# Patient Record
Sex: FEMALE | URBAN - METROPOLITAN AREA
[De-identification: names, ages, dates, MRNs, and addresses within clinical notes are randomized per-mention and may not be internally consistent; named-entity substitution may affect disease eponyms.]

---

## 2024-04-11 ENCOUNTER — APPOINTMENT (RX ONLY)
Dept: URBAN - METROPOLITAN AREA CLINIC 4 | Facility: CLINIC | Age: 44
Setting detail: DERMATOLOGY
End: 2024-04-11

## 2024-04-11 DIAGNOSIS — L93.2 OTHER LOCAL LUPUS ERYTHEMATOSUS: ICD-10-CM | Status: INADEQUATELY CONTROLLED

## 2024-04-11 DIAGNOSIS — L81.0 POSTINFLAMMATORY HYPERPIGMENTATION: ICD-10-CM

## 2024-04-11 PROCEDURE — ? PRESCRIPTION

## 2024-04-11 PROCEDURE — ? COUNSELING

## 2024-04-11 PROCEDURE — ? RECORDS REVIEWED

## 2024-04-11 PROCEDURE — ? REVIEW OF OUTSIDE PATHOLOGY REPORTS

## 2024-04-11 PROCEDURE — ? PHOTO-DOCUMENTATION

## 2024-04-11 PROCEDURE — ? LAB REPORTS REVIEWED

## 2024-04-11 PROCEDURE — 99204 OFFICE O/P NEW MOD 45 MIN: CPT

## 2024-04-11 PROCEDURE — ? DIAGNOSIS COMMENT

## 2024-04-11 PROCEDURE — ? PRESCRIPTION MEDICATION MANAGEMENT

## 2024-04-11 RX ORDER — PIMECROLIMUS 10 MG/G
1 CREAM TOPICAL BID
Qty: 60 | Refills: 2 | Status: ERX | COMMUNITY
Start: 2024-04-11

## 2024-04-11 RX ORDER — BETAMETHASONE DIPROPIONATE 0.5 MG/ML
1 LOTION TOPICAL BID
Qty: 60 | Refills: 2 | Status: ERX | COMMUNITY
Start: 2024-04-11

## 2024-04-11 RX ADMIN — BETAMETHASONE DIPROPIONATE 1: 0.5 LOTION TOPICAL at 00:00

## 2024-04-11 RX ADMIN — PIMECROLIMUS 1: 10 CREAM TOPICAL at 00:00

## 2024-04-11 ASSESSMENT — LOCATION DETAILED DESCRIPTION DERM
LOCATION DETAILED: SUPERIOR LUMBAR SPINE
LOCATION DETAILED: PERIUMBILICAL SKIN
LOCATION DETAILED: LEFT INFERIOR MEDIAL MIDBACK

## 2024-04-11 ASSESSMENT — LOCATION SIMPLE DESCRIPTION DERM
LOCATION SIMPLE: LOWER BACK
LOCATION SIMPLE: ABDOMEN
LOCATION SIMPLE: LEFT LOWER BACK

## 2024-04-11 ASSESSMENT — BSA RASH: BSA RASH: 10

## 2024-04-11 ASSESSMENT — SEVERITY ASSESSMENT: SEVERITY: MODERATE

## 2024-04-11 ASSESSMENT — LOCATION ZONE DERM: LOCATION ZONE: TRUNK

## 2024-04-11 NOTE — HPI: RASH
Is The Patient Presenting As Previously Scheduled?: No, they are a work-in
Is This A New Presentation, Or A Follow-Up?: Rash
Additional History: Itching comes and goes. Triggered by exercise. Patient believes it started on her side/back and then gradually spread over the years. Rash has been biopsied multiple times. 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 Recently saw auto immune specialist Dr. Nell Braswell. Ordered blood work (patient trying to obtain results) systemic lupus was ruled out. Patient took plaqunel for less than one month. Patient broke out in hives. Only talking probiotic and Prozac. Patient reports rash today is not itchy. Rash mainly itches at night. Lmp 03/2024

## 2024-04-11 NOTE — PROCEDURE: PRESCRIPTION MEDICATION MANAGEMENT
Initiate Treatment: Betamethasone lotion bid x 2 weeks then Pimecrolimus bid x 2 weeks following betamethasone, repeat
Detail Level: Zone
Render In Strict Bullet Format?: No

## 2024-04-11 NOTE — PROCEDURE: LAB REPORTS REVIEWED
Detail Level: Zone
Summarized Lab Results: 10/27/2019 - CBC (EOS H), CMP, UA (+RBC), MELISSA (WNL), DsDNA (neg), RNP (neg), smith ab (neg), anti-SS-A (neg), anti-SS-B (neg), anti-Laura-1 (neg), anti-centromere (neg), LDH (neg), Creatine Kinase (neg), Sed Rate (neg), Aldolase (neg)\\n1/17/2020 - UA (WNL)\\n7/2/2021 - MELISSA (negative)

## 2024-04-11 NOTE — PROCEDURE: DIAGNOSIS COMMENT
Detail Level: Zone
Comment: Patient reports rash present x 15 years,  reports hx of multiple biopsies. Patient reports most recent biopsy 05/2023 (bx proven date)(path report in records), reviewed at time of visit.  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  Discussed at length diagnosis of Tumid Lupus, discussed management, not curative. Patient Systemic lupus ruled out by Autoimmune Specialist, Dr. Nell Braswell. Patient reports she canceled for f/u with Dr. William on 4/10/24.\\n\\nDiscussed importance of strict sun protection on ALL skin, not just areas of rash.  Patient enjoys going to beach and being in the sun.\\n\\nIf patient desires oral medication, patient advised to return to rheumatologist. Patient wishes to attempt topical treatment monotherapy at this time.\\n\\nRecords available for review, received 2 hours prior to appointment.  Patient advised I will review in detail.  See below for summary. \\n\\nPreviously tried: topical steroids, non steroidal topicals, antihistamines, plaquenil x 1 month (patient broke out in hives)\\n\\nPatient has never used tobacco.
Render Risk Assessment In Note?: no

## 2024-04-11 NOTE — PROCEDURE: RECORDS REVIEWED
Number Of Unique Sources Reviewed: 1
Detail Level: Zone
Summary Of Office Notes Reviewed: [facility] visit notes:\\n5/21/2019 Rash x >10 years - hx punch bx 5 years ago-. Using compound topical (unknown name). Deferred Biopsy.\\n10/3/2019Biopsy mid lower back. \\n1/17/2020 - Rash worse in summer with outdoor activities.Biopsy proven lupus 10/72497. Differential lupus vs CTD vs DM. Labs RBC in UA, Renal functions WNL. Rx Tacrolimus 0.1 BID x 2 weeks, Triamcinolone 0.1 BID x 2 weeks\\n4/8/2021- not c/w DM, no active areas on chest, no nail fold capillaries/TGA, no periorbital involvement, no Gottron's papules, no prox muscle weakness.Erythematous patches with few annular patches on flank and abdomen.  Itch mild improvment with TAC.  Patient requested patch testing.  Patient declined Rx Plaquel. Rx clobetasol/tacrolimus.  Rx Allegra/zyrtec\\n5/28/2021 - Dx: Lupus. Rx Clobetasol and Tacrolimus 0.1%\\n5/30/2023 - Biopsy and discussed Rheumatology referral\\n6/13/2023 - s/r f/u biopsy\\n2/29/2024-Dr. Nell Braswell (autoimmune specialist) - patient had labs done, had appt scheduled for 4/10/24 to RTC (patient cancelled). Medications listed: Clobetasol ointment, Doxepin cream 5%, Medrol dose pack, Plaquenil 200 mg, Tacrolimus ointment, Triamcinolone 0.1 cream, Zyrtec

## 2024-04-11 NOTE — PROCEDURE: REVIEW OF OUTSIDE PATHOLOGY REPORTS
Detail Level: Zone
Summary Of Outside Pathology: [facility]: \\n10/3/2019: superficial and deep perivascular chronic inflammation with increased dermal mucin, suggestive of collagen vascular disease. Diff Dx: lupus Erythematosus or Dermatomyositis, findings are not independently diagnostic for CTCL or morphea. T-Cell Receptor Beta Gene -negative, T-Cell Receptor Gamma Gene Rearrangment negative, T-Cell receptor Beta Gene rearrangement \\n5/30/2023 Superficial and deep dermatitis with dermal mucinosis, c/w Lupus Erythematosus Tumidus. No evidence of cutaneous lymphoma.

## 2024-05-15 ENCOUNTER — APPOINTMENT (RX ONLY)
Dept: URBAN - METROPOLITAN AREA CLINIC 4 | Facility: CLINIC | Age: 44
Setting detail: DERMATOLOGY
End: 2024-05-15

## 2024-05-15 DIAGNOSIS — L81.0 POSTINFLAMMATORY HYPERPIGMENTATION: ICD-10-CM | Status: IMPROVED

## 2024-05-15 DIAGNOSIS — L93.2 OTHER LOCAL LUPUS ERYTHEMATOSUS: ICD-10-CM | Status: IMPROVED

## 2024-05-15 PROCEDURE — ? DIAGNOSIS COMMENT

## 2024-05-15 PROCEDURE — ? LAB REPORTS REVIEWED

## 2024-05-15 PROCEDURE — G2211 COMPLEX E/M VISIT ADD ON: HCPCS

## 2024-05-15 PROCEDURE — ? COUNSELING

## 2024-05-15 PROCEDURE — ? ADDITIONAL NOTES

## 2024-05-15 PROCEDURE — ? PRESCRIPTION

## 2024-05-15 PROCEDURE — ? PRESCRIPTION MEDICATION MANAGEMENT

## 2024-05-15 PROCEDURE — 99213 OFFICE O/P EST LOW 20 MIN: CPT

## 2024-05-15 PROCEDURE — ? VISIT COMPLEXITY

## 2024-05-15 PROCEDURE — ? PHOTO-DOCUMENTATION

## 2024-05-15 PROCEDURE — ? REVIEW OF OUTSIDE PATHOLOGY REPORTS

## 2024-05-15 RX ORDER — PIMECROLIMUS 10 MG/G
CREAM TOPICAL BID
Qty: 100 | Refills: 2 | Status: ERX

## 2024-05-15 ASSESSMENT — LOCATION SIMPLE DESCRIPTION DERM
LOCATION SIMPLE: LEFT LOWER BACK
LOCATION SIMPLE: ABDOMEN
LOCATION SIMPLE: LOWER BACK

## 2024-05-15 ASSESSMENT — LOCATION ZONE DERM: LOCATION ZONE: TRUNK

## 2024-05-15 NOTE — PROCEDURE: ADDITIONAL NOTES
Additional Notes: Historical Data: \\[facility] visit notes:\\n5/21/2019 Rash x >10 years - hx punch bx 5 years ago-. Using compound topical (unknown name). Deferred Biopsy.\\n10/3/2019Biopsy mid lower back. \\n1/17/2020 - Rash worse in summer with outdoor activities.Biopsy proven lupus 10/17420. Differential lupus vs CTD vs DM. Labs RBC in UA, Renal functions WNL. Rx Tacrolimus 0.1 BID x 2 weeks, Triamcinolone 0.1 BID x 2 weeks\\n4/8/2021- not c/w DM, no active areas on chest, no nail fold capillaries/TGA, no periorbital involvement, no Gottron's papules, no prox muscle weakness.Erythematous patches with few annular patches on flank and abdomen.  Itch mild improvment with TAC.  Patient requested patch testing.  Patient declined Rx Plaquel. Rx clobetasol/tacrolimus.  Rx Allegra/zyrtec\\n5/28/2021 - Dx: Lupus. Rx Clobetasol and Tacrolimus 0.1%\\n5/30/2023 - Biopsy and discussed Rheumatology referral\\n6/13/2023 - s/r f/u biopsy\\n2/29/2024-Dr. Nell Braswell (autoimmune specialist) - patient had labs done and did review with Dr. Braswell (patient cancelled). Medications listed: Clobetasol ointment, Doxepin cream 5%, Medrol dose pack, Plaquenil 200 mg, Tacrolimus ointment, Triamcinolone 0.1 cream, Zyrtec
Render Risk Assessment In Note?: no
Detail Level: Zone

## 2024-05-15 NOTE — PROCEDURE: REVIEW OF OUTSIDE PATHOLOGY REPORTS
Detail Level: Zone
Summary Of Outside Pathology: [facility]: \\n10/3/2019: superficial and deep perivascular chronic inflammation with increased dermal mucin, suggestive of collagen vascular disease. Diff Dx: lupus Erythematosus or Dermatomyositis, findings are not independently diagnostic for CTCL or morphea. T-Cell Receptor Beta Gene -negative, T-Cell Receptor Gamma Gene Rearrangment negative, T-Cell receptor Beta Gene rearrangement \\n5/30/2023 Superficial and deep dermatitis with dermal mucinosis, c/w Lupus Erythematosus Tumidus. No evidence of cutaneous lymphoma.

## 2024-05-15 NOTE — PROCEDURE: LAB REPORTS REVIEWED
Detail Level: Zone
Summarized Lab Results: 10/27/2019 - CBC (EOS H), CMP, UA (+RBC), MELISSA (WNL), DsDNA (neg), RNP (neg), smith ab (neg), anti-SS-A (neg), anti-SS-B (neg), anti-Laura-1 (neg), anti-centromere (neg), LDH (neg), Creatine Kinase (neg), Sed Rate (neg), Aldolase (neg)\\n1/17/2020 - UA (WNL)\\n7/2/2021 - MELISSA (negative)\\n2/29/2024-Dr. Nell Braswell (autoimmune specialist)- labs done- patient reports she was advised negative, \"no systemic findings\"

## 2024-05-15 NOTE — PROCEDURE: PRESCRIPTION MEDICATION MANAGEMENT
Detail Level: Zone
Render In Strict Bullet Format?: No
Continue Regimen: Betamethasone lotion bid x 2 weeks then Pimecrolimus bid x 2 weeks following betamethasone, repeat\\n\\nCare with Dr. Nell Braswell (autoimmune specialist) at least ONCE a year for follow up and labs  (last visit/labs 2/29/2024)

## 2024-05-15 NOTE — PROCEDURE: DIAGNOSIS COMMENT
Detail Level: Zone
Comment: Patient has not rescheduled canceled 4/2024 appointment with Dr. Braswell. Advised a minimum of once yearly visit with Dr. William or Rheumatology is recommended for labs/UA.  Patient advised she would need to return to Dr. Braswell or Rheumatology if she desired to try another PO medication (had allergic reaction to Plaquenil). Patient wishes to stick with topical tx. Discussed additional biopsy, not warranted. Overall rash is lighter today and improved. Discussed difference between active lupus and PIH. \\n\\nPatient reports rash present x 15 years,  reports hx of multiple biopsies. Reviewed previous biopsies with patient and .  Advised biopsy dated 5/2023 confirms diagnosis of Tumid Lupus and is sufficient.  Discussed at length diagnosis of Tumid Lupus, discussed management, not curative. Patient reports Systemic lupus ruled out by Autoimmune Specialist, Dr. Nell Braswell. Patient reports she canceled for f/u with Dr. William on 4/10/24.\\n\\nDiscussed again AT LENGTH importance of strict sun protection on ALL skin, not just areas of rash.  Patient enjoys going to beach, being in the sun and being tanned.  Patient advised UV Radiation has the potential to exacerbate and trigger a Lupus flare forever. Patient advised it is in her best interest for long term Lupus management and health to develop good UV protection/avoidance habits. \\n\\nIf patient desires oral medication, patient advised to return to rheumatologist. Patient wishes to attempt topical treatment monotherapy at this time.\\n\\nRecords available for review, received 2 hours prior to appointment.  Patient advised I will review in detail.  See below for summary. \\n\\nPreviously tried: topical steroids, non steroidal topicals, antihistamines, plaquenil x 1 month (patient broke out in hives)\\n\\nPatient has never used tobacco.
Render Risk Assessment In Note?: no

## 2025-02-10 ENCOUNTER — APPOINTMENT (OUTPATIENT)
Dept: URBAN - METROPOLITAN AREA CLINIC 4 | Facility: CLINIC | Age: 45
Setting detail: DERMATOLOGY
End: 2025-02-10

## 2025-02-10 DIAGNOSIS — L93.2 OTHER LOCAL LUPUS ERYTHEMATOSUS: ICD-10-CM | Status: INADEQUATELY CONTROLLED

## 2025-02-10 DIAGNOSIS — L64.8 OTHER ANDROGENIC ALOPECIA: ICD-10-CM

## 2025-02-10 DIAGNOSIS — L81.0 POSTINFLAMMATORY HYPERPIGMENTATION: ICD-10-CM

## 2025-02-10 PROCEDURE — ? PRESCRIPTION MEDICATION MANAGEMENT

## 2025-02-10 PROCEDURE — ? COUNSELING

## 2025-02-10 PROCEDURE — G2211 COMPLEX E/M VISIT ADD ON: HCPCS

## 2025-02-10 PROCEDURE — ? ADDITIONAL NOTES

## 2025-02-10 PROCEDURE — ? PRESCRIPTION

## 2025-02-10 PROCEDURE — ? PHOTO-DOCUMENTATION

## 2025-02-10 PROCEDURE — ? DIAGNOSIS COMMENT

## 2025-02-10 PROCEDURE — ? POINTED OUT BY PATIENT

## 2025-02-10 PROCEDURE — ? VISIT COMPLEXITY

## 2025-02-10 PROCEDURE — 99214 OFFICE O/P EST MOD 30 MIN: CPT

## 2025-02-10 RX ORDER — PIMECROLIMUS 10 MG/G
CREAM TOPICAL BID
Qty: 100 | Refills: 2 | Status: ERX

## 2025-02-10 RX ORDER — BETAMETHASONE DIPROPIONATE 0.5 MG/ML
LOTION TOPICAL BID
Qty: 60 | Refills: 2 | Status: ERX

## 2025-02-10 ASSESSMENT — LOCATION SIMPLE DESCRIPTION DERM
LOCATION SIMPLE: LEFT LOWER BACK
LOCATION SIMPLE: LOWER BACK
LOCATION SIMPLE: ABDOMEN
LOCATION SIMPLE: SCALP

## 2025-02-10 ASSESSMENT — LOCATION DETAILED DESCRIPTION DERM
LOCATION DETAILED: SUPERIOR LUMBAR SPINE
LOCATION DETAILED: LEFT INFERIOR MEDIAL MIDBACK
LOCATION DETAILED: LEFT SUPERIOR PARIETAL SCALP
LOCATION DETAILED: PERIUMBILICAL SKIN

## 2025-02-10 ASSESSMENT — LOCATION ZONE DERM
LOCATION ZONE: SCALP
LOCATION ZONE: TRUNK

## 2025-02-10 NOTE — PROCEDURE: ADDITIONAL NOTES
Additional Notes: Historical Data: \\[facility] visit notes:\\n5/21/2019 Rash x >10 years - hx punch bx 5 years ago-. Using compound topical (unknown name). Deferred Biopsy.\\n10/3/2019Biopsy mid lower back. \\n1/17/2020 - Rash worse in summer with outdoor activities.Biopsy proven lupus 10/70685. Differential lupus vs CTD vs DM. Labs RBC in UA, Renal functions WNL. Rx Tacrolimus 0.1 BID x 2 weeks, Triamcinolone 0.1 BID x 2 weeks\\n4/8/2021- not c/w DM, no active areas on chest, no nail fold capillaries/TGA, no periorbital involvement, no Gottron's papules, no prox muscle weakness.Erythematous patches with few annular patches on flank and abdomen.  Itch mild improvment with TAC.  Patient requested patch testing.  Patient declined Rx Plaquel. Rx clobetasol/tacrolimus.  Rx Allegra/zyrtec\\n5/28/2021 - Dx: Lupus. Rx Clobetasol and Tacrolimus 0.1%\\n5/30/2023 - Biopsy and discussed Rheumatology referral\\n6/13/2023 - s/r f/u biopsy\\n2/29/2024-Dr. Nell Braswell (autoimmune specialist) - patient had labs done and did review with Dr. Braswell (patient cancelled). Medications listed: Clobetasol ointment, Doxepin cream 5%, Medrol dose pack, Plaquenil 200 mg, Tacrolimus ointment, Triamcinolone 0.1 cream, Zyrtec
Render Risk Assessment In Note?: no
Detail Level: Zone
Additional Notes: Summary of Outside Pathology: [facility]: \\n10/3/2019: superficial and deep perivascular chronic inflammation with increased dermal mucin, suggestive of collagen vascular disease. Diff Dx: lupus Erythematosus or Dermatomyositis, findings are not independently diagnostic for CTCL or morphea. T-Cell Receptor Beta Gene -negative, T-Cell Receptor Gamma Gene Rearrangment negative, T-Cell receptor Beta Gene rearrangement \\n5/30/2023 Superficial and deep dermatitis with dermal mucinosis, c/w Lupus Erythematosus Tumidus. No evidence of cutaneous lymphoma.

## 2025-02-10 NOTE — PROCEDURE: DIAGNOSIS COMMENT
Detail Level: Zone
Comment: Patient has not rescheduled appointment with Dr. Braswell. Advised again recommendation of once yearly visit with Dr. William or Rheumatology is recommended for labs/UA.  Patient advised she would need to return to Dr. Braswell or Rheumatology if she desired to try another PO medication (had allergic reaction to Plaquenil). Patient wishes to stick with topical tx. . Overall rash is lighter today and improved. Discussed difference between active lupus and PIH. \\n\\nDiscussed with patient Tumid Lupus does appear more inflamed today with more actively inflamed lesions, after photo review patient agrees with assessment of more active today. \\n\\nDiscussed again AT LENGTH continued importance of strict sun protection on ALL skin, not just areas of rash.  Patient advised UV Radiation has the potential to exacerbate and trigger a Lupus flare forever. \\n\\nPatient enjoys going to beach, being in the sun and being tanned. Patient advised it is in her best interest for long term Lupus management and health to develop good UV protection/avoidance habits. Also advised Lupus may flare without UV exposure\\n\\nIf patient desires oral medication, patient advised to return to rheumatologist. \\n\\nPreviously tried: topical steroids, non steroidal topicals, antihistamines, plaquenil x 1 month (patient broke out in hives)\\n\\nPatient has never used tobacco.
Render Risk Assessment In Note?: no
Comment: No lupus on the scalp. Recommends nutrafol.

## 2025-02-10 NOTE — PROCEDURE: PRESCRIPTION MEDICATION MANAGEMENT
Detail Level: Zone
Render In Strict Bullet Format?: No
Continue Regimen: Betamethasone lotion bid x 2 weeks then Pimecrolimus bid x 2 weeks following betamethasone, repeat\\n\\nCare with Dr. Nell Braswell (autoimmune specialist) at least ONCE a year for follow up and labs  (last visit/labs 2/29/2024) - patient advised to call and schedule appointment.